# Patient Record
Sex: FEMALE | Race: WHITE | NOT HISPANIC OR LATINO | Employment: OTHER | URBAN - METROPOLITAN AREA
[De-identification: names, ages, dates, MRNs, and addresses within clinical notes are randomized per-mention and may not be internally consistent; named-entity substitution may affect disease eponyms.]

---

## 2017-04-03 RX ORDER — THYROID,PORK 90 MG
TABLET ORAL
Refills: 2 | OUTPATIENT
Start: 2017-04-03

## 2017-04-13 RX ORDER — THYROID,PORK 90 MG
90 TABLET ORAL
Qty: 30 TAB | Refills: 0 | Status: SHIPPED | OUTPATIENT
Start: 2017-04-13 | End: 2017-05-08 | Stop reason: SDUPTHER

## 2017-04-16 DIAGNOSIS — E03.8 HYPOTHYROIDISM DUE TO HASHIMOTO'S THYROIDITIS: ICD-10-CM

## 2017-04-16 DIAGNOSIS — E06.3 HYPOTHYROIDISM DUE TO HASHIMOTO'S THYROIDITIS: ICD-10-CM

## 2017-04-20 LAB
T3FREE SERPL-MCNC: 3.9 PG/ML (ref 2–4.4)
T4 FREE SERPL-MCNC: 1.01 NG/DL (ref 0.82–1.77)
TSH SERPL DL<=0.005 MIU/L-ACNC: 0.56 UIU/ML (ref 0.45–4.5)

## 2017-04-26 ENCOUNTER — OFFICE VISIT (OUTPATIENT)
Dept: ENDOCRINOLOGY | Facility: MEDICAL CENTER | Age: 52
End: 2017-04-26
Payer: COMMERCIAL

## 2017-04-26 VITALS
SYSTOLIC BLOOD PRESSURE: 112 MMHG | HEIGHT: 64 IN | OXYGEN SATURATION: 93 % | BODY MASS INDEX: 28.34 KG/M2 | DIASTOLIC BLOOD PRESSURE: 70 MMHG | WEIGHT: 166 LBS | HEART RATE: 68 BPM

## 2017-04-26 DIAGNOSIS — R60.9 EDEMA, IDIOPATHIC: Primary | ICD-10-CM

## 2017-04-26 DIAGNOSIS — E04.1 THYROID NODULE: ICD-10-CM

## 2017-04-26 DIAGNOSIS — E06.3 HYPOTHYROIDISM, ACQUIRED, AUTOIMMUNE: ICD-10-CM

## 2017-04-26 PROCEDURE — 99214 OFFICE O/P EST MOD 30 MIN: CPT | Performed by: INTERNAL MEDICINE

## 2017-04-26 RX ORDER — SPIRONOLACTONE 50 MG/1
TABLET, FILM COATED ORAL
Qty: 60 TAB | Refills: 6 | Status: SHIPPED | OUTPATIENT
Start: 2017-04-26 | End: 2017-10-26 | Stop reason: SDUPTHER

## 2017-04-26 NOTE — MR AVS SNAPSHOT
"        Shikha Nieves   2017 2:40 PM   Office Visit   MRN: [id number]    Department:  Endocrinology [facility]   Dept Phone:  [phone]    Description:  Female : 1965   Provider:  Ricky Gavin M.D.           Allergies as of 2017     Allergen Noted Reactions    Codeine 2012   Vomiting      You were diagnosed with     Edema, idiopathic   [919858]  -  Primary     Hypothyroidism, acquired, autoimmune   [014276]       Thyroid nodule   [740688]         Vital Signs     Blood Pressure Pulse Height Weight Body Mass Index Oxygen Saturation    112/70 mmHg 68 1.626 m (5' 4\") 75.297 kg (166 lb) 28.48 kg/m2 93%    Smoking Status                   Never Smoker            Basic Information     Date Of Birth Sex Race Ethnicity Preferred Language    1965 Female White Non- English      Problem List              ICD-10-CM Priority Class Noted - Resolved    Chronic thyroiditis E06.5   2012 - Present    Thyroid nodule E04.1   Unknown - Present    Gluten intolerance K90.0   2014 - Present    Edema, idiopathic R60.9   2014 - Present    Hypothyroidism, acquired, autoimmune E03.8   2017 - Present      Health Maintenance        Date Due Completion Dates    IMM DTaP/Tdap/Td Vaccine (1 - Tdap) 1983 ---    PAP SMEAR 1985 ---    COLONOSCOPY 2014 ---    MAMMOGRAM 2015, 2012, 2009, 2009, 2007, 2007            Current Immunizations     No immunizations on file.      Below and/or attached are the medications your provider expects you to take. Review all of your home medications and newly ordered medications with your provider and/or pharmacist. Follow medication instructions as directed by your provider and/or pharmacist. Please keep your medication list with you and share with your provider. Update the information when medications are discontinued, doses are changed, or new medications (including over-the-counter products) are " added; and carry medication information at all times in the event of emergency situations     Allergies:  CODEINE - Vomiting               Medications  Valid as of: April 26, 2017 -  3:14 PM    Generic Name Brand Name Tablet Size Instructions for use    Cholecalciferol (Tab) Vitamin D3 5000 UNITS Take  by mouth every day.        Spironolactone (Tab) ALDACTONE 100 MG Take 100 mg by mouth every day.        Spironolactone (Tab) ALDACTONE 50 MG One or 2 tablets as necessary daily for edema        Thyroid (Tab) ARMOUR THYROID 90 MG Take 1 Tab by mouth every day.        .                 Medicines prescribed today were sent to:     [id number] - [place], [place] - [address] [facility] [address]    Phone: [phone] Fax: [phone]    Open 24 Hours?: No    [id number] - [place], [place] - [address] [facility]    [address] [facility] [address]    Phone: [phone] Fax: [phone]    Open 24 Hours?: No      Medication refill instructions:       If your prescription bottle indicates you have medication refills left, it is not necessary to call your provider’s office. Please contact your pharmacy and they will refill your medication.    If your prescription bottle indicates you do not have any refills left, you may request refills at any time through one of the following ways: The online Dashwire system (except Urgent Care), by calling your provider’s office, or by asking your pharmacy to contact your provider’s office with a refill request. Medication refills are processed only during regular business hours and may not be available until the next business day. Your provider may request additional information or to have a follow-up visit with you prior to refilling your medication.   *Please Note: Medication refills are assigned a new Rx number when refilled electronically. Your pharmacy may indicate that no refills were authorized even though a new prescription for the same  medication is available at the pharmacy. Please request the medicine by name with the pharmacy before contacting your provider for a refill.        Your To Do List     Future Labs/Procedures Complete By Expires    COMP METABOLIC PANEL  As directed 10/27/2017         Belmont Access Code: [email or web address]  Expires: 5/26/2017  3:14 PM    Belmont  A secure, online tool to manage your health information     [employer]’s Belmont® is a secure, online tool that connects you to your personalized health information from the privacy of your home -- day or night - making it very easy for you to manage your healthcare. Once the activation process is completed, you can even access your medical information using the Belmont ilana, which is available for free in the Apple Ilana store or Google Play store.     Belmont provides the following levels of access (as shown below):   My Chart Features   [facility]  Specialists [facility]  Urgent  Care [facility]  Primary Care  Doctor   Email your healthcare team securely and privately 24/7 X X X    Manage appointments: schedule your next appointment; view details of past/upcoming appointments X      Request prescription refills. X      View recent personal medical records, including lab and immunizations X X X X   View health record, including health history, allergies, medications X X X X   Read reports about your outpatient visits, procedures, consult and ER notes X X X X   See your discharge summary, which is a recap of your hospital and/or ER visit that includes your diagnosis, lab results, and care plan. X X       How to register for Belmont:  1. Go to  [email or web address].  2. Click on the Sign Up Now box, which takes you to the New Member Sign Up page. You will need to provide the following information:  a. Enter your Belmont Access Code exactly as it appears at the top of this page. (You will not need to use this code after you’ve completed the sign-up  process. If you do not sign up before the expiration date, you must request a new code.)   b. Enter your date of birth.   c. Enter your home email address.   d. Click Submit, and follow the next screen’s instructions.  3. Create a Lookert ID. This will be your Lookert login ID and cannot be changed, so think of one that is secure and easy to remember.  4. Create a Lookert password. You can change your password at any time.  5. Enter your Password Reset Question and Answer. This can be used at a later time if you forget your password.   6. Enter your e-mail address. This allows you to receive e-mail notifications when new information is available in [employer].  7. Click Sign Up. You can now view your health information.    For assistance activating your [employer] account, call [phone]

## 2017-04-27 NOTE — PROGRESS NOTES
Chief Complaint   Patient presents with   • Hypothyroidism        HPI:        1. Hypothyroidism.    The patient is very pleased with her response to [place] thyroid 90 mg per day.  She has taken it now for 2-3 years and finds it quite satisfactory without symptoms of hyper or hypothyroid.  Recent TSH 0.5 and free T4 1.0 and free T3 upper normal 3.9.  No dose change indicated.    2. Chronic autoimmune thyroiditis.    Her gland is not enlarged but on ultrasound is heterogeneous and positive TPO antibodies at 209.  The gland is not symptomatic.     3. Thyroid nodule.    A 5 mm nodule in the right lobe which is stable compared to 2015.      4. Question cyclic edema.    The patient has had a tendency to marked weight shifts as much as 6-8 pounds in a day she claims.  She does not develop pitting edema or other symptoms of failure.  She does not have renal failure.  This edema will come and go.  Over the years she has learned that spironolactone helps her manage it effectively. She does restrict salt in her diet.      ROS:  All other systems reported as negative or unchanged since last exam      Allergies:   Allergies   Allergen Reactions   • Codeine Vomiting       Current medicines including changes today:  Current Outpatient Prescriptions   Medication Sig Dispense Refill   • Cholecalciferol (VITAMIN D3) 5000 UNITS Tab Take  by mouth every day.     • spironolactone (ALDACTONE) 50 MG Tab One or 2 tablets as necessary daily for edema 60 Tab 6   • ARMOUR THYROID 90 MG Tab Take 1 Tab by mouth every day. 30 Tab 0     No current facility-administered medications for this visit.        Past Medical History   Diagnosis Date   • Hypothyroidism    • Lumbar disc disease 2006     L4-5 discectomy   • Amenorrhea 2008     uterine ablation / [employer]Sure   • H/O arthroscopy 1980     R knee   • Chronic thyroiditis      + ab / + ultrasound   • Thyroid nodule      R  / 5 mm   • Gluten intolerance 2014   • Idiopathic edema        PHYSICAL  "EXAM:    /70 mmHg  Pulse 68  Ht 1.626 m (5' 4\")  Wt 75.297 kg (166 lb)  BMI 28.48 kg/m2  SpO2 93%    Gen.   appears healthy     Skin   appropriate for sex and age    HEENT  unremarkable    Neck   thyroid gland is about normal size somewhat difficult to palpate. No palpable nodules and the gland or elsewhere in the neck or supraclavicular areas.    Heart  regular    Extremities  the upper arms and legs have a sort of puffy or bloated appearance and feel to them that she feels it is fluid retention. It is not pitting or tender    Neuro  gait and station normal    Psych  appropriate    ASSESSMENT AND RECOMMENDATIONS    1. Hypothyroidism, acquired, autoimmune            Adequately controlled with [place] Thyroid 90 mg per day    2. Chronic autoimmune thyroiditis           Asymptomatic without gland enlargement                 3.  Thyroid nodule              Incidental 5 mm nodule unchanged over 2 years    4.  Presumptive idiopathic cyclic edema               Salt restriction and the use of spironolactone  mg by mouth daily when necessary    - COMP METABOLIC PANEL; Future  - spironolactone (ALDACTONE) 50 MG Tab; One or 2 tablets as necessary daily for edema  Dispense: 60 Tab; Refill: 6      DISPOSITION:   Return in one year       Ricky Gavin M.D.    Copies to: Cassie Vasquez M.D. [phone]  "

## 2017-04-28 LAB
ALBUMIN SERPL-MCNC: 4.5 G/DL (ref 3.5–5.5)
ALBUMIN/GLOB SERPL: 2.3 {RATIO} (ref 1.2–2.2)
ALP SERPL-CCNC: 109 IU/L (ref 39–117)
ALT SERPL-CCNC: 31 IU/L (ref 0–32)
AST SERPL-CCNC: 23 IU/L (ref 0–40)
BILIRUB SERPL-MCNC: 0.6 MG/DL (ref 0–1.2)
BUN SERPL-MCNC: 14 MG/DL (ref 6–24)
BUN/CREAT SERPL: 15 (ref 9–23)
CALCIUM SERPL-MCNC: 9.6 MG/DL (ref 8.7–10.2)
CHLORIDE SERPL-SCNC: 100 MMOL/L (ref 96–106)
CO2 SERPL-SCNC: 23 MMOL/L (ref 18–29)
CREAT SERPL-MCNC: 0.91 MG/DL (ref 0.57–1)
GLOBULIN SER CALC-MCNC: 2 G/DL (ref 1.5–4.5)
GLUCOSE SERPL-MCNC: 92 MG/DL (ref 65–99)
POTASSIUM SERPL-SCNC: 4.3 MMOL/L (ref 3.5–5.2)
PROT SERPL-MCNC: 6.5 G/DL (ref 6–8.5)
SODIUM SERPL-SCNC: 139 MMOL/L (ref 134–144)

## 2017-05-09 RX ORDER — THYROID,PORK 90 MG
TABLET ORAL
Qty: 30 TAB | Refills: 6 | Status: SHIPPED | OUTPATIENT
Start: 2017-05-09 | End: 2018-05-14

## 2017-05-09 RX ORDER — THYROID,PORK 90 MG
TABLET ORAL
Qty: 30 TAB | Refills: 0 | Status: SHIPPED | OUTPATIENT
Start: 2017-05-09 | End: 2017-06-05 | Stop reason: SDUPTHER

## 2017-06-05 RX ORDER — THYROID,PORK 90 MG
TABLET ORAL
Qty: 30 TAB | Refills: 3 | Status: SHIPPED | OUTPATIENT
Start: 2017-06-05 | End: 2018-01-03 | Stop reason: SDUPTHER

## 2017-08-08 ENCOUNTER — TELEPHONE (OUTPATIENT)
Dept: ENDOCRINOLOGY | Facility: MEDICAL CENTER | Age: 52
End: 2017-08-08

## 2017-08-08 NOTE — TELEPHONE ENCOUNTER
Pt called regarding the dosage of their spironolactone. They are currently prescribed 100 mg a day; however, the patient stated that sometimes they will take two in a day to help get rid of their edema more. The patient wanted to know if it would be possible to raise their daily dosage for this medication.

## 2017-08-15 NOTE — TELEPHONE ENCOUNTER
Stay with the prescribed dose. An occasional extra okay. Be careful about salt exposure. The water you retain is salt water. The more salt there is in your body the more water will follow.    Ricky Gavin M.D.

## 2017-10-26 DIAGNOSIS — R60.9 EDEMA, IDIOPATHIC: ICD-10-CM

## 2017-10-30 RX ORDER — SPIRONOLACTONE 50 MG/1
TABLET, FILM COATED ORAL
Qty: 60 TAB | Refills: 2 | Status: SHIPPED | OUTPATIENT
Start: 2017-10-30 | End: 2018-01-03 | Stop reason: SDUPTHER

## 2018-01-03 DIAGNOSIS — R60.9 EDEMA, IDIOPATHIC: ICD-10-CM

## 2018-01-03 RX ORDER — SPIRONOLACTONE 50 MG/1
TABLET, FILM COATED ORAL
Qty: 60 TAB | Refills: 2 | Status: SHIPPED | OUTPATIENT
Start: 2018-01-03 | End: 2018-02-05 | Stop reason: SDUPTHER

## 2018-01-03 RX ORDER — THYROID 90 MG/1
90 TABLET ORAL
Qty: 30 TAB | Refills: 3 | Status: SHIPPED | OUTPATIENT
Start: 2018-01-03 | End: 2018-05-14

## 2018-02-05 DIAGNOSIS — R60.9 EDEMA, IDIOPATHIC: ICD-10-CM

## 2018-02-05 RX ORDER — SPIRONOLACTONE 50 MG/1
TABLET, FILM COATED ORAL
Qty: 60 TAB | Refills: 1 | Status: SHIPPED | OUTPATIENT
Start: 2018-02-05 | End: 2018-04-01 | Stop reason: SDUPTHER

## 2018-04-14 DIAGNOSIS — R60.9 EDEMA, IDIOPATHIC: ICD-10-CM

## 2018-04-16 RX ORDER — SPIRONOLACTONE 50 MG/1
TABLET, FILM COATED ORAL
Qty: 60 TAB | Refills: 1 | Status: SHIPPED | OUTPATIENT
Start: 2018-04-16 | End: 2018-07-08 | Stop reason: SDUPTHER

## 2018-05-07 ENCOUNTER — TELEPHONE (OUTPATIENT)
Dept: ENDOCRINOLOGY | Facility: MEDICAL CENTER | Age: 53
End: 2018-05-07

## 2018-05-07 DIAGNOSIS — E06.3 HYPOTHYROIDISM, ACQUIRED, AUTOIMMUNE: Primary | ICD-10-CM

## 2018-05-07 RX ORDER — THYROID,PORK 90 MG
90 TABLET ORAL DAILY
Qty: 30 TAB | Refills: 5 | Status: SHIPPED | OUTPATIENT
Start: 2018-05-07 | End: 2018-10-28 | Stop reason: SDUPTHER

## 2018-05-07 NOTE — TELEPHONE ENCOUNTER
Shikha left msg she needs armour thyroid filled today because she took last pill today. She has an appt 5/14/18 but will need medication to last until then.

## 2018-05-08 NOTE — TELEPHONE ENCOUNTER
" I let Shikha know: Dr. Gavin wrote, \"Refill request sent to [facility] pharmacy on [facility].\"  "

## 2018-05-14 ENCOUNTER — OFFICE VISIT (OUTPATIENT)
Dept: ENDOCRINOLOGY | Facility: MEDICAL CENTER | Age: 53
End: 2018-05-14
Payer: COMMERCIAL

## 2018-05-14 VITALS
DIASTOLIC BLOOD PRESSURE: 70 MMHG | OXYGEN SATURATION: 97 % | BODY MASS INDEX: 25.69 KG/M2 | HEIGHT: 65 IN | HEART RATE: 65 BPM | SYSTOLIC BLOOD PRESSURE: 120 MMHG | WEIGHT: 154.2 LBS

## 2018-05-14 DIAGNOSIS — E06.3 HYPOTHYROIDISM, ACQUIRED, AUTOIMMUNE: ICD-10-CM

## 2018-05-14 DIAGNOSIS — R60.9 EDEMA, IDIOPATHIC: ICD-10-CM

## 2018-05-14 DIAGNOSIS — E04.1 THYROID NODULE: Primary | ICD-10-CM

## 2018-05-14 DIAGNOSIS — E06.5 CHRONIC THYROIDITIS: ICD-10-CM

## 2018-05-14 PROCEDURE — 99214 OFFICE O/P EST MOD 30 MIN: CPT | Performed by: INTERNAL MEDICINE

## 2018-05-14 RX ORDER — THYROID 90 MG/1
90 TABLET ORAL
Qty: 30 TAB | Refills: 6 | Status: CANCELLED | OUTPATIENT
Start: 2018-05-14

## 2018-05-15 LAB
ALBUMIN SERPL-MCNC: 4.7 G/DL (ref 3.5–5.5)
ALBUMIN/GLOB SERPL: 2 {RATIO} (ref 1.2–2.2)
ALP SERPL-CCNC: 134 IU/L (ref 39–117)
ALT SERPL-CCNC: 23 IU/L (ref 0–32)
AST SERPL-CCNC: 23 IU/L (ref 0–40)
BILIRUB SERPL-MCNC: 0.4 MG/DL (ref 0–1.2)
BUN SERPL-MCNC: 14 MG/DL (ref 6–24)
BUN/CREAT SERPL: 14 (ref 9–23)
CALCIUM SERPL-MCNC: 9.7 MG/DL (ref 8.7–10.2)
CHLORIDE SERPL-SCNC: 98 MMOL/L (ref 96–106)
CO2 SERPL-SCNC: 25 MMOL/L (ref 18–29)
CREAT SERPL-MCNC: 0.99 MG/DL (ref 0.57–1)
GFR SERPLBLD CREATININE-BSD FMLA CKD-EPI: 65 ML/MIN/1.73
GFR SERPLBLD CREATININE-BSD FMLA CKD-EPI: 75 ML/MIN/1.73
GLOBULIN SER CALC-MCNC: 2.3 G/DL (ref 1.5–4.5)
GLUCOSE SERPL-MCNC: 92 MG/DL (ref 65–99)
POTASSIUM SERPL-SCNC: 4.4 MMOL/L (ref 3.5–5.2)
PROT SERPL-MCNC: 7 G/DL (ref 6–8.5)
SODIUM SERPL-SCNC: 139 MMOL/L (ref 134–144)
T3FREE SERPL-MCNC: 3.2 PG/ML (ref 2–4.4)
T4 FREE SERPL-MCNC: 0.84 NG/DL (ref 0.82–1.77)
TSH SERPL DL<=0.005 MIU/L-ACNC: 1.63 UIU/ML (ref 0.45–4.5)

## 2018-05-15 NOTE — PROGRESS NOTES
"Chief Complaint   Patient presents with   • Hypothyroidism     chronic thyroiditis / thyroid nodule        HPI:    See Assessment and recommendations below    ROS:  Constitutional   feels generally healthy  Eyes   vision stable  ENT    no pain or epistaxis, no unusual congestion  Cardiac   no angina, no arrhythmia  Respiratory   no hemoptysis, no unusual dyspnea, no cough  GI    no pain, indigestion, or unexpected bowel change     no voiding symptoms  Musculoskeletal    no unusual or new pains  Skin   no suspicious or concerning lesions  Neuro  no unusual weakness or loss of function  Psych   appears alert and appropriate  Lymph   no new or unusual lumps or nodules      Allergies:   Allergies   Allergen Reactions   • Codeine Vomiting       Current medicines including changes today:  Current Outpatient Prescriptions   Medication Sig Dispense Refill   • ARMOUR THYROID 90 MG Tab Take 1 Tab by mouth every day. 30 Tab 5   • spironolactone (ALDACTONE) 50 MG Tab TAKE 1 TO 2 TABLETS BY MOUTH EVERY DAY AS NEEDED FOR EDEMA 60 Tab 1   • Cholecalciferol (VITAMIN D3) 5000 UNITS Tab Take  by mouth every day.       No current facility-administered medications for this visit.         Past Medical History:   Diagnosis Date   • Amenorrhea 2008    uterine ablation / NovaSure   • Chronic thyroiditis     + ab / + ultrasound   • Gluten intolerance 2014   • H/O arthroscopy 1980    R knee   • Hypothyroidism    • Idiopathic edema    • Lumbar disc disease 2006    L4-5 discectomy   • Thyroid nodule     R  / 5 mm       PHYSICAL EXAM:    /70   Pulse 65   Ht 1.638 m (5' 4.5\")   Wt 69.9 kg (154 lb 3.2 oz)   SpO2 97%   BMI 26.06 kg/m²      Gen.   appears healthy in no distress    Skin   appropriate for sex and age    HEENT  unremarkable    Neck   Thyroid gland is relatively small and difficult to palpate. No tenderness. No nodules    Heart  regular    Extremities    Upper arms are very well-developed I think from swimming. She wonders " if she has edema around the biceps area. I don't think so but she feels like its  as opposed to muscle                        No pitting edema around the ankles or any place else that is apparent    Neuro  gait and station normal    Psych  appropriate    ASSESSMENT AND RECOMMENDATIONS    1. Hypothyroidism, acquired, autoimmune           Clinically euthyroid taking [place] Thyroid 90 mg per day. She is pleased with her replacement thyroid and dose.           Recent TSH is low normal at 0.5.  Free T4 is mid range at 1.0.   Free T3 upper normal but normal at 3.9.           No dose change indicated  - FREE THYROXINE;   - T3 FREE;   - TSH;     2. Thyroid nodule          Previously identified a right lobe 5 mm nodule which might be part of the thyroiditis process but should be followed by ultrasound  - US-SOFT TISSUES OF HEAD - NECK; Future    3. Chronic thyroiditis            Thyroid gland is asymptomatic. Patient is not aware of any change in the gland. No discomfort. No difficulty swallowing, breathing, or voice change.    4. Edema, idiopathic            We discussed using spironolactone 50 - 100 mg as necessary for edema. She has been using 100 mg daily and I don't know that she needs that much regularly            She should strictly avoid extra sodium sources. She should weigh daily and that could help her decide if she needs spironolactone each day or not  - COMP METABOLIC PANEL;       DISPOSITION:   Update lab and ultrasound and report by my chart or telephone                              Depending on lab she may return in 6 months or a year.       Ricky Gavin M.D.    Copies to: Cassie Vasquez M.D. [phone]

## 2018-07-08 DIAGNOSIS — R60.9 EDEMA, IDIOPATHIC: ICD-10-CM

## 2018-07-09 RX ORDER — SPIRONOLACTONE 50 MG/1
TABLET, FILM COATED ORAL
Qty: 60 TAB | Refills: 1 | Status: SHIPPED | OUTPATIENT
Start: 2018-07-09 | End: 2018-10-27 | Stop reason: SDUPTHER

## 2018-07-09 NOTE — TELEPHONE ENCOUNTER
Was the patient seen in the last year in this department? Yes     Does patient have an active prescription for medications requested? No     Received Request Via: Pharmacy     spironolactone (ALDACTONE) 50 MG Tab  TAKE 1 TO 2 TABLETS BY MOUTH EVERY DAY AS NEEDED FOR EDEMA

## 2018-10-27 DIAGNOSIS — R60.9 EDEMA, IDIOPATHIC: ICD-10-CM

## 2018-10-28 DIAGNOSIS — E06.3 HYPOTHYROIDISM, ACQUIRED, AUTOIMMUNE: ICD-10-CM

## 2018-10-29 RX ORDER — THYROID,PORK 90 MG
TABLET ORAL
Qty: 30 TAB | Refills: 5 | Status: SHIPPED | OUTPATIENT
Start: 2018-10-29 | End: 2019-04-18 | Stop reason: SDUPTHER

## 2018-10-30 RX ORDER — SPIRONOLACTONE 50 MG/1
TABLET, FILM COATED ORAL
Qty: 60 TAB | Refills: 3 | Status: SHIPPED | OUTPATIENT
Start: 2018-10-30

## 2019-04-18 DIAGNOSIS — E06.3 HYPOTHYROIDISM, ACQUIRED, AUTOIMMUNE: ICD-10-CM

## 2019-04-22 RX ORDER — THYROID,PORK 90 MG
TABLET ORAL
Qty: 30 TAB | Refills: 5 | Status: SHIPPED | OUTPATIENT
Start: 2019-04-22 | End: 2019-10-08 | Stop reason: SDUPTHER

## 2019-04-22 NOTE — TELEPHONE ENCOUNTER
Was the patient seen in the last year in this department? Yes 5/14/18    Does patient have an active prescription for medications requested? No     Received Request Via: Pharmacy     YSABEL THYROID 90 MG Tab  TAKE 1 TABLET BY MOUTH EVERY DAY

## 2019-10-08 DIAGNOSIS — E06.3 HYPOTHYROIDISM, ACQUIRED, AUTOIMMUNE: ICD-10-CM

## 2019-10-08 RX ORDER — THYROID,PORK 90 MG
TABLET ORAL
Qty: 30 TAB | Refills: 5 | Status: SHIPPED | OUTPATIENT
Start: 2019-10-08 | End: 2020-02-05

## 2019-10-08 NOTE — TELEPHONE ENCOUNTER
Was the patient seen in the last year in this department? No  05/14/18    Does patient have an active prescription for medications requested? No     Received Request Via: Pharmacy     YSABEL THYROID 90 MG Tab    Sig: TAKE 1 TABLET BY MOUTH EVERY DAY

## 2020-02-05 DIAGNOSIS — E06.3 HYPOTHYROIDISM, ACQUIRED, AUTOIMMUNE: ICD-10-CM

## 2020-02-05 RX ORDER — THYROID,PORK 90 MG
TABLET ORAL
Qty: 90 TAB | Refills: 1 | Status: SHIPPED | OUTPATIENT
Start: 2020-02-05

## 2020-02-05 NOTE — TELEPHONE ENCOUNTER
Received request via: Pharmacy    Was the patient seen in the last year in this department? No     Does the patient have an active prescription (recently filled or refills available) for medication(s) requested? No     YSABEL THYROID 90 MG Tab        Sig: TAKE 1 TABLET BY MOUTH EVERY DAY

## 2021-03-15 DIAGNOSIS — Z23 NEED FOR VACCINATION: ICD-10-CM
